# Patient Record
Sex: FEMALE | Race: OTHER | Employment: UNEMPLOYED | URBAN - METROPOLITAN AREA
[De-identification: names, ages, dates, MRNs, and addresses within clinical notes are randomized per-mention and may not be internally consistent; named-entity substitution may affect disease eponyms.]

---

## 2020-06-19 PROBLEM — N83.209 HEMORRHAGIC OVARIAN CYST: Status: ACTIVE | Noted: 2020-06-19

## 2020-06-19 PROBLEM — Z28.39 RUBELLA NON-IMMUNE STATUS, ANTEPARTUM: Status: ACTIVE | Noted: 2020-06-19

## 2020-06-19 PROBLEM — O09.899 RUBELLA NON-IMMUNE STATUS, ANTEPARTUM: Status: ACTIVE | Noted: 2020-06-19

## 2020-06-19 PROBLEM — Z36.82 NUCHAL TRANSLUCENCY OF FETUS ON PRENATAL ULTRASOUND: Status: ACTIVE | Noted: 2020-06-19

## 2020-06-19 PROBLEM — O28.5 ABNORMAL GENETIC TEST DURING PREGNANCY: Status: ACTIVE | Noted: 2020-06-19

## 2020-06-19 PROBLEM — O26.899 RH NEGATIVE STATE IN ANTEPARTUM PERIOD: Status: ACTIVE | Noted: 2020-06-19

## 2020-06-19 PROBLEM — Z67.91 RH NEGATIVE STATE IN ANTEPARTUM PERIOD: Status: ACTIVE | Noted: 2020-06-19

## 2020-07-13 PROBLEM — N83.209 HEMORRHAGIC OVARIAN CYST: Status: RESOLVED | Noted: 2020-06-19 | Resolved: 2020-07-13

## 2020-07-13 PROBLEM — O28.3 INCREASED NUCHAL TRANSLUCENCY SPACE ON FETAL ULTRASOUND: Status: ACTIVE | Noted: 2020-06-19

## 2020-07-26 ENCOUNTER — HOSPITAL ENCOUNTER (EMERGENCY)
Age: 30
Discharge: HOME | End: 2020-07-26
Attending: EMERGENCY MEDICINE
Payer: COMMERCIAL

## 2020-07-26 VITALS
BODY MASS INDEX: 22.04 KG/M2 | TEMPERATURE: 99.3 F | HEART RATE: 99 BPM | HEIGHT: 65 IN | OXYGEN SATURATION: 100 % | RESPIRATION RATE: 18 BRPM | WEIGHT: 132.28 LBS | SYSTOLIC BLOOD PRESSURE: 136 MMHG | DIASTOLIC BLOOD PRESSURE: 85 MMHG

## 2020-07-26 DIAGNOSIS — Z34.92 SECOND TRIMESTER PREGNANCY: ICD-10-CM

## 2020-07-26 DIAGNOSIS — O20.0 THREATENED MISCARRIAGE: Primary | ICD-10-CM

## 2020-07-26 PROCEDURE — 99282 EMERGENCY DEPT VISIT SF MDM: CPT

## 2020-07-26 NOTE — ED PROVIDER NOTES
Pleasant healthy 38-year-old female who is G1, P0 at about 17 weeks gestation presents with vaginal bleeding. Onset maybe 30 minutes ago while seated at rest at home watching television with her . Bleeding was initially bright red but then darkened. She is currently being followed for apparent thickened nuchal folds on 14-week ultrasound. History reviewed. No pertinent past medical history. Past Surgical History:   Procedure Laterality Date    HX WISDOM TEETH EXTRACTION           History reviewed. No pertinent family history.     Social History     Socioeconomic History    Marital status:      Spouse name: Not on file    Number of children: Not on file    Years of education: Not on file    Highest education level: Not on file   Occupational History    Not on file   Social Needs    Financial resource strain: Not on file    Food insecurity     Worry: Not on file     Inability: Not on file    Transportation needs     Medical: Not on file     Non-medical: Not on file   Tobacco Use    Smoking status: Former Smoker    Smokeless tobacco: Never Used   Substance and Sexual Activity    Alcohol use: Not Currently    Drug use: Never    Sexual activity: Not on file   Lifestyle    Physical activity     Days per week: Not on file     Minutes per session: Not on file    Stress: Not on file   Relationships    Social connections     Talks on phone: Not on file     Gets together: Not on file     Attends [employer] service: Not on file     Active member of club or organization: Not on file     Attends meetings of clubs or organizations: Not on file     Relationship status: Not on file    Intimate partner violence     Fear of current or ex partner: Not on file     Emotionally abused: Not on file     Physically abused: Not on file     Forced sexual activity: Not on file   Other Topics Concern    Not on file   Social History Narrative    Not on file         ALLERGIES: Patient has no known allergies. Review of Systems   Gastrointestinal: Negative for abdominal pain, diarrhea and vomiting. Genitourinary: Positive for vaginal bleeding. Negative for dysuria and pelvic pain. Vitals:    07/26/20 1228   BP: 136/85   Pulse: 99   Resp: 18   Temp: 99.3 °F (37.4 °C)   SpO2: 100%   Weight: 60 kg (132 lb 4.4 oz)   Height: 5' 4.57\" (1.64 m)            Physical Exam  Vitals signs and nursing note reviewed. Constitutional:       General: She is not in acute distress. Appearance: Normal appearance. She is well-developed. She is not ill-appearing, toxic-appearing or diaphoretic. HENT:      Head: Normocephalic and atraumatic. Eyes:      General:         Right eye: No discharge. Left eye: No discharge. Conjunctiva/sclera: Conjunctivae normal.   Neck:      Musculoskeletal: Normal range of motion and neck supple. Pulmonary:      Effort: Pulmonary effort is normal. No respiratory distress. Abdominal:      General: Abdomen is flat. There is no distension. Tenderness: There is no abdominal tenderness. There is no guarding or rebound. Musculoskeletal: Normal range of motion. Skin:     General: Skin is warm and dry. Findings: No rash. Neurological:      General: No focal deficit present. Mental Status: She is alert and oriented to person, place, and time. Mental status is at baseline. Motor: No abnormal muscle tone. Comments: cni 2-12 grossly  Nl gait,  Nl speech     Psychiatric:         Mood and Affect: Mood normal.         Behavior: Behavior normal.          MDM  Number of Diagnoses or Management Options  Second trimester pregnancy:   Threatened miscarriage:   Diagnosis management comments: Medical decision making note:  Miscarriage, good heart rate good fetal movement on bedside ultrasound  Threatened miscarriage precautions discussed  This concludes the \"medical decision making note\" part of this emergency department visit note.           Bedside     Date/Time: 7/26/2020 1:18 PM  Performed by: Winona Schaumann, MD  Authorized by: Winona Schaumann, MD     Written consent obtained: Yes    Performed by: Attending  Type of procedure:   Focused pelvic ultrasound obstetrical  Indications:  Vaginal bleeding  Intrauterine Pregnancy:  Present  Fetal heart    Fetal motion

## 2020-07-26 NOTE — ED NOTES
I have reviewed discharge instructions with the patient. The patient verbalized understanding. Patient left ED via Discharge Method: ambulatory to Home with spouse. Opportunity for questions and clarification provided. Patient given 0 scripts. Reinforced that pt needs to call OBGYN promptly tomorrow morning at 0800 when they open. Also encouraged pt to stay off feet and bed rest accordingly. Encouraged lots of water as well. To continue your aftercare when you leave the hospital, you may receive an automated call from our care team to check in on how you are doing. This is a free service and part of our promise to provide the best care and service to meet your aftercare needs.  If you have questions, or wish to unsubscribe from this service please call [phone]. Thank you for Choosing our [facility] Emergency Department.

## 2020-07-26 NOTE — DISCHARGE INSTRUCTIONS
.No intercourse, tampons, douching - nothing in vagina,  No heavy lifting or rigorous sports  Bedrest  Follow up with OB/Gyn    Patient Education     Amenaza de aborto espontáneo: Después de windy visita a la radha de emergencias  [Threatened Miscarriage: After Your Visit to the Emergency Room]  Instrucciones de cuidado  Algunas mujeres tienen manchado o sangrado vaginal leve kristen las primeras 12 semanas del [facility]. En algunos casos, esto es normal. El manchado o sangrado vaginal leve también puede ser windy señal de Amanda Lori posible pérdida del embarazo. [place] se conoce mark amenaza de aborto espontáneo. En priyank punto, es posible que el médico no pueda decirle si rogers sangrado vaginal es normal o es windy amenaza de aborto espontáneo. Al principio del embarazo, cosas mark el estrés, el ejercicio y las relaciones sexuales no provocan sangrado vaginal o aborto espontáneo. Podría estar preocupada o kaylen por la posibilidad de perder el embarazo. Antionette no se culpe. No existe un tratamiento para detener windy amenaza de aborto espontáneo. Si tiene un aborto espontáneo, no hay nada que pueda collin hecho para prevenirlo. Un aborto espontáneo generalmente significa que el embarazo no se está desarrollando normalmente. Aunque haya sido adilene de shelby de la radha de emergencias, todavía es necesario que esté atenta a cualquier problema que se presente. El médico le hizo un cuidadoso chequeo. Antionette a veces los problemas pueden aparecer posteriormente. Si tiene nuevos síntomas o éstos no mejoran, regrese a la radha de emergencias o llame a rogers médico de inmediato. Acudir a la radha de emergencias es solo un paso en rogers tratamiento. Aunque se sienta mejor, todavía deberá hacer lo que rogers médico le recomiende, mark asistir a todas las visitas de seguimiento sugeridas y radhika los medicamentos exactamente [employer]	[place] fueron indicados. [place] le ayudará a recuperarse y prevenir problemas futuros. ¿Cómo puede cuidarse en el hogar?   · Si tiene un aborto espontáneo, es probable que experimente algo de sangrado vaginal kristen 1 a 2 semanas. Use toallas sanitarias en lugar de tampones. Cuente las toallas sanitarias que [place] cada día y lleve un registro de la cantidad. [place] la ayudará a saber si el sangrado está disminuyendo. · [place] acetaminofén (Tylenol) para los cólicos (retortijones). Zulema y siga todas las instrucciones de la [place]. · No tome dos o más analgésicos (medicamentos para el dolor) al [employer] tiempo a menos que el médico se lo haya indicado. Muchos analgésicos contienen acetaminofén, es decir, Tylenol. El exceso de acetaminofén (Tylenol) puede ser dañino. · No tenga relaciones sexuales hasta que rogers médico lo apruebe. · Descanse mucho kristen los adelina siguientes. · Puede hacer samina actividades habituales si se siente lo suficientemente pete para hacerlas. Antionette no david ejercicio pesado hasta que rogers médico lo apruebe. · Consuma windy dieta balanceada beth en yara y vitamina C. Entre los alimentos ricos en yara se encuentran las whit escoto, los [place], los [place], los frijoles (habichuelas) y los vegetales de hojas verdes. Los alimentos con alto contenido de vitamina C incluyen las frutas cítricas, los tomates y el brócoli. Hable con rogers médico acerca de si usted necesita radhika pastillas de yara o un multivitamínico.  · No mahnaz alcohol, ni use tabaco o drogas ilegales. · No fume. Si necesita ayuda para dejar de fumar, hable con rogers médico acerca de los programas y medicamentos para dejar de fumar. Estos pueden aumentar samina probabilidades de dejar el hábito para siempre. ¿Cuándo debe pedir ayuda? Llame al 911 si:  · Tiene dolor repentino e intenso en el vientre. · Se desmayó (perdió el conocimiento). Regrese ahora mismo a la radha de emergencias si:  · Tiene sangrado vaginal intenso. Elimina coágulos de Deri Ruel y empapa por completo las toallas sanitarias usuales cada hora kristen 2 o más horas.   · Vuelve el sangrado vaginal.  Llame hoy a rogers médico si:  · Tiene cólicos o dolor en el vientre o la pelvis. · Tiene fiebre. · Tiene flujo vaginal con un olor desagradable. · El sangrado vaginal no se ha detenido completamente después de 3 días. ¿Dónde puede encontrar más información en inglés? Vaya a [employer]  [place] S217 en la búsqueda para aprender más acerca de \"Amenaza de aborto espontáneo: Después de windy visita a la radha de emergencias. \"   © 8159-3507 Healthwise, [employer]. Instrucciones de cuidado adaptadas por [employer] (which disclaims liability or warranty for this information). Estas instrucciones de cuidado son para usarlas con rogers profesional clínico registrado. Si usted tiene preguntas acerca de windy condición médica o acerca de estas instrucciones de cuidado, siempre pregúntele a rogers profesional clínico registrado. Healthwise, Inc[employer] no acepta ninguna garantía ni responsabilidad por el uso de [employer].   Versión del contenido: [phone]; Última revisión: 13 enero, 2011

## 2020-07-26 NOTE — ED TRIAGE NOTES
Pt states 18 weeks pregnant and started having vaginal bleeding 15 minutes PTA. States bright red blood with clots. LLQ cramping.  is waiting in car.

## 2020-07-28 PROBLEM — O46.90 VAGINAL BLEEDING IN PREGNANCY: Status: ACTIVE | Noted: 2020-07-28

## 2020-07-28 PROBLEM — D25.9 UTERINE FIBROID: Status: ACTIVE | Noted: 2020-07-28

## 2020-08-11 PROBLEM — O35.9XX0 FETAL ABNORMALITY AFFECTING MANAGEMENT OF MOTHER: Status: ACTIVE | Noted: 2020-06-19
